# Patient Record
Sex: FEMALE | Race: WHITE | NOT HISPANIC OR LATINO | Employment: UNEMPLOYED | URBAN - METROPOLITAN AREA
[De-identification: names, ages, dates, MRNs, and addresses within clinical notes are randomized per-mention and may not be internally consistent; named-entity substitution may affect disease eponyms.]

---

## 2019-11-18 DIAGNOSIS — R87.619 ABNORMAL PAP SMEAR OF CERVIX: Primary | ICD-10-CM

## 2019-11-25 ENCOUNTER — OFFICE VISIT (OUTPATIENT)
Dept: OBSTETRICS AND GYNECOLOGY | Facility: CLINIC | Age: 29
End: 2019-11-25
Payer: MEDICAID

## 2019-11-25 VITALS
SYSTOLIC BLOOD PRESSURE: 131 MMHG | BODY MASS INDEX: 22.96 KG/M2 | HEIGHT: 71 IN | WEIGHT: 164 LBS | HEART RATE: 73 BPM | DIASTOLIC BLOOD PRESSURE: 86 MMHG

## 2019-11-25 DIAGNOSIS — F17.200 SMOKER: ICD-10-CM

## 2019-11-25 DIAGNOSIS — Z98.51 HISTORY OF TUBAL LIGATION: ICD-10-CM

## 2019-11-25 DIAGNOSIS — Z01.419 ENCOUNTER FOR GYNECOLOGICAL EXAMINATION WITHOUT ABNORMAL FINDING: Primary | ICD-10-CM

## 2019-11-25 DIAGNOSIS — N92.6 IRREGULAR MENSTRUAL CYCLE: ICD-10-CM

## 2019-11-25 PROCEDURE — 99385 PR PREVENTIVE VISIT,NEW,18-39: ICD-10-PCS | Mod: S$GLB,,, | Performed by: OBSTETRICS & GYNECOLOGY

## 2019-11-25 PROCEDURE — 99385 PREV VISIT NEW AGE 18-39: CPT | Mod: S$GLB,,, | Performed by: OBSTETRICS & GYNECOLOGY

## 2019-11-25 RX ORDER — VALACYCLOVIR HYDROCHLORIDE 500 MG/1
500 TABLET, FILM COATED ORAL 2 TIMES DAILY
Qty: 10 TABLET | Refills: 6 | Status: SHIPPED | OUTPATIENT
Start: 2019-11-25 | End: 2020-04-23

## 2019-11-25 RX ORDER — CYPROHEPTADINE HYDROCHLORIDE 4 MG/1
4 TABLET ORAL 3 TIMES DAILY PRN
COMMUNITY
End: 2020-04-23

## 2019-11-25 RX ORDER — ATORVASTATIN CALCIUM 20 MG/1
20 TABLET, FILM COATED ORAL DAILY
COMMUNITY
End: 2020-04-23

## 2019-11-25 NOTE — LETTER
November 25, 2019      CHAIM Jaimes-C  [facility] [address]           [place] [facility]  [address]  [place] [address]  Phone: [phone]  Fax: [phone]          Patient: Sandra Benito   MR Number: [id number]   YOB: 1990   Date of Visit: 11/25/2019       Dear Aleyda Antunez:    Thank you for referring Sandra Benito to me for evaluation. Attached you will find relevant portions of my assessment and plan of care.    If you have questions, please do not hesitate to call me. I look forward to following Sandra Benito along with you.    Sincerely,    Jair Moralez MD    Enclosure  CC:  No Recipients    If you would like to receive this communication electronically, please contact [email or web address] or [phone] to request more information on [facility] Link access.    For providers and/or their staff who would like to refer a patient to [facility], please contact us through our one-stop-shop provider referral line, [facility], at [phone].    If you feel you have received this communication in error or would no longer like to receive these types of communications, please e-mail [email or web address]

## 2019-11-25 NOTE — PROGRESS NOTES
Subjective:       Patient ID: Sandra Benito is a 29 y.o. female.    Chief Complaint:  Annual Exam      History of Present Illness  Pt here for gyn annual.  History and past labs reviewed with patient.    Complaints of several issues.    1.- pcos- patient states she has been diagnosed in the past.  She does not appear to have PCOS on initial inspection.  Her periods are abnormal.  We talked about taking birth control the patient declined.      2.  Decreased sex drive.  Patient has 3 children and a fiancee and some issues concerning herpes.  She is only 29 years old is worried that is hormonal.  We talked about this at length as well.     3.  Patient states that she has herpes.  However the outbreaks or on the inside of her thigh well away from her vaginal area.  She has had was cultured of 1 point.  She said she not could not take the medicine that started within a for her herpes and asked for something different.  I am not sure that she does in fact have genital herpes and asked her to come back whenever she had an outbreak so we cultured here.    4.  Patient has had abnormal Pap smears in the past.    5.  Patient wants to have another baby although she had her tubes tied.  We talked about seen Dr. marques at      Review of Systems  Review of Systems   Constitutional: Negative for chills and fever.   Respiratory: Negative for shortness of breath.    Cardiovascular: Negative for chest pain.   Gastrointestinal: Negative for abdominal pain, blood in stool, constipation, diarrhea, nausea, vomiting and reflux.   Genitourinary: Negative for dysmenorrhea, dyspareunia, dysuria, hematuria, hot flashes, menorrhagia, menstrual problem, pelvic pain, vaginal bleeding, vaginal discharge, postcoital bleeding and vaginal dryness.   Musculoskeletal: Negative for arthralgias and joint swelling.   Integumentary:  Negative for rash, hair changes, breast mass, nipple discharge and breast skin changes.   Psychiatric/Behavioral: Negative  for depression. The patient is not nervous/anxious.    Breast: Negative for asymmetry, lump, mass, nipple discharge and skin changes          Objective:    Physical Exam:   Constitutional: She appears well-developed and well-nourished. No distress.    HENT:   Head: Normocephalic and atraumatic.    Eyes: Conjunctivae and EOM are normal.    Neck: No tracheal deviation present. No thyromegaly present.    Cardiovascular: Exam reveals no clubbing, no cyanosis and no edema.     Pulmonary/Chest: Effort normal. No respiratory distress.        Abdominal: Soft. She exhibits no distension and no mass. There is no tenderness. There is no rebound and no guarding. No hernia.     Genitourinary: Rectum normal, vagina normal and uterus normal. Pelvic exam was performed with patient supine. There is no rash, tenderness, lesion or injury on the right labia. There is no rash, tenderness, lesion or injury on the left labia. Cervix is normal. Right adnexum displays no mass, no tenderness and no fullness. Left adnexum displays no mass, no tenderness and no fullness.                Skin: She is not diaphoretic. No cyanosis. Nails show no clubbing.        breast exam wnl- no nipple dc, skin changes, masses or lymph nodes palpated bilaterally    Assessment:        1. Encounter for gynecological examination without abnormal finding    2. Smoker    3. Irregular menstrual cycle    4. History of tubal ligation               Plan:      Pap  Referral ANTONETTE  Declines birth control

## 2019-12-09 ENCOUNTER — PROCEDURE VISIT (OUTPATIENT)
Dept: OBSTETRICS AND GYNECOLOGY | Facility: CLINIC | Age: 29
End: 2019-12-09
Payer: MEDICAID

## 2019-12-09 VITALS
HEART RATE: 60 BPM | DIASTOLIC BLOOD PRESSURE: 72 MMHG | BODY MASS INDEX: 20.36 KG/M2 | SYSTOLIC BLOOD PRESSURE: 109 MMHG | WEIGHT: 146 LBS

## 2019-12-09 DIAGNOSIS — R87.610 ASCUS WITH POSITIVE HIGH RISK HPV CERVICAL: Primary | ICD-10-CM

## 2019-12-09 DIAGNOSIS — R87.810 ASCUS WITH POSITIVE HIGH RISK HPV CERVICAL: Primary | ICD-10-CM

## 2019-12-09 PROCEDURE — 57452 COLPOSCOPY: ICD-10-PCS | Mod: S$GLB,,, | Performed by: OBSTETRICS & GYNECOLOGY

## 2019-12-09 PROCEDURE — 57452 EXAM OF CERVIX W/SCOPE: CPT | Mod: S$GLB,,, | Performed by: OBSTETRICS & GYNECOLOGY

## 2019-12-09 NOTE — PROCEDURES
Colposcopy  Date/Time: 12/9/2019 8:30 AM  Performed by: Jair Moralez MD  Authorized by: Jair Moralez MD     Consent Done?:  Yes (Verbal)    Colposcopy Site:  Cervix  Position:  Supine  Acrowhite Lesion: No    Atypical Vessels: No    Transformation Zone Adequate?: Yes    Biopsy?: No    ECC Performed?: No    LEEP Performed?: No    Estimated blood loss (cc):  0   Patient tolerated the procedure well with no immediate complications.     repap in 6 mo

## 2020-04-17 DIAGNOSIS — N39.0 FREQUENT UTI: Primary | ICD-10-CM

## 2020-04-23 ENCOUNTER — OFFICE VISIT (OUTPATIENT)
Dept: UROLOGY | Facility: CLINIC | Age: 30
End: 2020-04-23
Payer: COMMERCIAL

## 2020-04-23 ENCOUNTER — DOCUMENTATION ONLY (OUTPATIENT)
Dept: UROLOGY | Facility: CLINIC | Age: 30
End: 2020-04-23

## 2020-04-23 VITALS
WEIGHT: 146 LBS | RESPIRATION RATE: 18 BRPM | HEIGHT: 71 IN | DIASTOLIC BLOOD PRESSURE: 62 MMHG | SYSTOLIC BLOOD PRESSURE: 105 MMHG | HEART RATE: 56 BPM | BODY MASS INDEX: 20.44 KG/M2

## 2020-04-23 DIAGNOSIS — R10.2 PELVIC PAIN: ICD-10-CM

## 2020-04-23 DIAGNOSIS — N39.46 MIXED INCONTINENCE: Primary | ICD-10-CM

## 2020-04-23 DIAGNOSIS — R31.0 HEMATURIA, GROSS: ICD-10-CM

## 2020-04-23 DIAGNOSIS — N39.0 FREQUENT UTI: ICD-10-CM

## 2020-04-23 PROBLEM — R30.9 URINARY PAIN: Status: ACTIVE | Noted: 2018-07-15

## 2020-04-23 PROBLEM — R87.610 ATYPICAL SQUAMOUS CELL CHANGES OF UNDETERMINED SIGNIFICANCE (ASCUS) ON CERVICAL CYTOLOGY WITH POSITIVE HIGH RISK HUMAN PAPILLOMA VIRUS (HPV): Status: ACTIVE | Noted: 2018-02-20

## 2020-04-23 PROBLEM — R87.810 ATYPICAL SQUAMOUS CELL CHANGES OF UNDETERMINED SIGNIFICANCE (ASCUS) ON CERVICAL CYTOLOGY WITH POSITIVE HIGH RISK HUMAN PAPILLOMA VIRUS (HPV): Status: ACTIVE | Noted: 2018-02-20

## 2020-04-23 PROBLEM — A60.04 HERPES, VULVAR: Status: ACTIVE | Noted: 2018-10-03

## 2020-04-23 PROBLEM — Z98.51 STATUS POST TUBAL LIGATION: Status: ACTIVE | Noted: 2018-10-03

## 2020-04-23 PROBLEM — M79.7 FIBROMYALGIA: Status: ACTIVE | Noted: 2018-10-03

## 2020-04-23 PROBLEM — K58.9 IRRITABLE BOWEL SYNDROME: Status: ACTIVE | Noted: 2018-10-03

## 2020-04-23 PROBLEM — Z87.898 HISTORY OF SEIZURES: Status: ACTIVE | Noted: 2018-12-21

## 2020-04-23 LAB
BILIRUB UR QL STRIP: POSITIVE
BILIRUB UR QL STRIP: POSITIVE
GLUCOSE UR QL STRIP: NEGATIVE
GLUCOSE UR QL STRIP: NEGATIVE
KETONES UR QL STRIP: NEGATIVE
KETONES UR QL STRIP: POSITIVE
LEUKOCYTE ESTERASE UR QL STRIP: NEGATIVE
LEUKOCYTE ESTERASE UR QL STRIP: NEGATIVE
PH, POC UA: 5.5
PH, POC UA: 6
POC AMORP, URINE: ABNORMAL
POC AMORP, URINE: ABNORMAL
POC BACTI, URINE: ABNORMAL
POC BACTI, URINE: ABNORMAL
POC BLOOD, URINE: POSITIVE
POC BLOOD, URINE: POSITIVE
POC CASTS, URINE: ABNORMAL
POC CASTS, URINE: ABNORMAL
POC CRYST, URINE: ABNORMAL
POC CRYST, URINE: ABNORMAL
POC EPITH, URINE: ABNORMAL
POC EPITH, URINE: ABNORMAL
POC HCG, URINE: ABNORMAL
POC HCG, URINE: ABNORMAL
POC HYALIN, URINE: 0 LPF
POC HYALIN, URINE: 0 LPF
POC MUCUS, URINE: ABNORMAL
POC MUCUS, URINE: ABNORMAL
POC NITRATES, URINE: NEGATIVE
POC NITRATES, URINE: NEGATIVE
POC OTHER, URINE: ABNORMAL
POC OTHER, URINE: ABNORMAL
POC RBC, URINE: ABNORMAL HPF
POC RBC, URINE: ABNORMAL HPF
POC WBC, URINE: ABNORMAL HPF
POC WBC, URINE: ABNORMAL HPF
PROT UR QL STRIP: NEGATIVE
PROT UR QL STRIP: POSITIVE
SP GR UR STRIP: >=1.03 (ref 1–1.03)
SP GR UR STRIP: >=1.03 (ref 1–1.03)
UROBILINOGEN UR STRIP-ACNC: 1 (ref 0.1–1.1)
UROBILINOGEN UR STRIP-ACNC: 1 (ref 0.1–1.1)

## 2020-04-23 PROCEDURE — 3008F PR BODY MASS INDEX (BMI) DOCUMENTED: ICD-10-PCS | Mod: CPTII,S$GLB,, | Performed by: SPECIALIST

## 2020-04-23 PROCEDURE — 99204 OFFICE O/P NEW MOD 45 MIN: CPT | Mod: 25,S$GLB,, | Performed by: SPECIALIST

## 2020-04-23 PROCEDURE — 81002 PR URINALYSIS NONAUTO W/O SCOPE: ICD-10-PCS | Mod: S$GLB,,, | Performed by: SPECIALIST

## 2020-04-23 PROCEDURE — 81002 URINALYSIS NONAUTO W/O SCOPE: CPT | Mod: S$GLB,,, | Performed by: SPECIALIST

## 2020-04-23 PROCEDURE — 99204 PR OFFICE/OUTPT VISIT, NEW, LEVL IV, 45-59 MIN: ICD-10-PCS | Mod: 25,S$GLB,, | Performed by: SPECIALIST

## 2020-04-23 PROCEDURE — 3008F BODY MASS INDEX DOCD: CPT | Mod: CPTII,S$GLB,, | Performed by: SPECIALIST

## 2020-04-23 NOTE — PROGRESS NOTES
Subjective:       Patient ID: Sandra Benito is a 29 y.o. female.    Chief Complaint: Urinary Tract Infection (ongoing issue since infantcy); Flank Pain (approx 4 years/stays consistant); Other (pain in urthera area); Urinary Frequency (loses control of bladder since 2013 after ); and other (pressure/stinging pain when urinating)      HPI:  29-year-old female presenting to extend the urological care.  She has a very complex history.    As a child she has been having recurring urinary tract infections she reports.  Then there was an intervening period of several years when she did well.  She had a  section in  and started experiencing has recurrent infections as well as some incontinence.    Most of her symptoms have started within the last 3 years.  In 2018 she started reporting gross hematuria.  Since then she has blood in the urine at least once every month.  She also reports recurrent infections that normally present with pelvic pain as well as burning in the urethra.  She reports that these are culture proven infections.  She was told that she grows E coli in the urine.    She was seen by a urologist in [facility] who was planning to do cystoscopic evaluation but they could not get a scope in the patient because of a lot of tension and inability to just get the scope to pass into the bladder.  She reports that she was in severe pain so decision was made to proceed to the operating room to perform cystoscopy but she did not have insurance at that time.    She has been diagnosed with irritable bowel syndrome, she has polycystic ovary syndrome, there is no official diagnosis of endometriosis, she has been diagnosed with fibromyalgia.  She also reports inability to have sexual intercourse because he is so painful.  She reports losing control of urine when she coughs knees or strains as well as sometimes without making it to the bathroom.  She has to frequently change of clotting because of the  incontinence.  Again she reports these infections which seem to coincide with her monthly menstruation.    Patient denies any smoking but she has been baby in for so many years.    Past Medical History:   Past Medical History:   Diagnosis Date    ADHD     Anxiety     Bronchitis     Depression     Fibromyalgia     Head ache     Herpes     High cholesterol     Seizures     Thyroid disease        Past Surgical Historical:   Past Surgical History:   Procedure Laterality Date     SECTION      x2    DILATION AND CURETTAGE OF UTERUS      TUBAL LIGATION          Medications:   Medication List with Changes/Refills   Discontinued Medications    ATORVASTATIN (LIPITOR) 20 MG TABLET    Take 20 mg by mouth once daily.    CYPROHEPTADINE (PERIACTIN) 4 MG TABLET    Take 4 mg by mouth 3 (three) times daily as needed.    VALACYCLOVIR (VALTREX) 500 MG TABLET    Take 1 tablet (500 mg total) by mouth 2 (two) times daily. for 5 days        Past Social History:   Social History     Socioeconomic History    Marital status:      Spouse name: Not on file    Number of children: Not on file    Years of education: Not on file    Highest education level: Not on file   Occupational History    Not on file   Social Needs    Financial resource strain: Not on file    Food insecurity:     Worry: Not on file     Inability: Not on file    Transportation needs:     Medical: Not on file     Non-medical: Not on file   Tobacco Use    Smoking status: Current Every Day Smoker     Types: Vaping with nicotine   Substance and Sexual Activity    Alcohol use: Yes    Drug use: Never    Sexual activity: Yes     Partners: Male     Birth control/protection: See Surgical Hx   Lifestyle    Physical activity:     Days per week: Not on file     Minutes per session: Not on file    Stress: Not on file   Relationships    Social connections:     Talks on phone: Not on file     Gets together: Not on file     Attends Faith service:  Not on file     Active member of club or organization: Not on file     Attends meetings of clubs or organizations: Not on file     Relationship status: Not on file   Other Topics Concern    Not on file   Social History Narrative    Not on file       Allergies:   Review of patient's allergies indicates:   Allergen Reactions    Adhesive tape-silicones Rash        Family History:   Family History   Problem Relation Age of Onset    Hypertension Mother         Review of Systems:  Review of Systems - General ROS: negative  Psychological ROS: negative  Ophthalmic ROS: negative  ENT ROS: negative  Allergy and Immunology ROS: negative  Hematological and Lymphatic ROS: negative  Endocrine ROS: negative  Respiratory ROS: no cough, shortness of breath, or wheezing  Cardiovascular ROS: no chest pain or dyspnea on exertion  Gastrointestinal ROS: no abdominal pain, change in bowel habits, or black or bloody stools  Genito-Urinary ROS: positive for - recurring year old lower urinary tract symptoms pelvic pain  Musculoskeletal ROS: negative  Neurological ROS: no TIA or stroke symptoms  Dermatological ROS: negative     Physical Exam:  General Appearance:    Alert, cooperative, no distress, appears stated age   Head:    Normocephalic, without obvious abnormality, atraumatic   Eyes:    PERRL, conjunctiva/corneas clear, EOM's intact, fundi     benign, both eyes   Ears:    Normal TM's and external ear canals, both ears   Nose:   Nares normal, septum midline, mucosa normal, no drainage    or sinus tenderness   Throat:   Lips, mucosa, and tongue normal; teeth and gums normal   Neck:   Supple, symmetrical, trachea midline, no adenopathy;     thyroid:  no enlargement/tenderness/nodules; no carotid    bruit or JVD   Back:     Symmetric, no curvature, ROM normal, no CVA tenderness   Lungs:     Clear to auscultation bilaterally, respirations unlabored   Chest Wall:    No tenderness or deformity    Heart:    Regular rate and rhythm, S1 and  S2 normal, no murmur, rub   or gallop   Breast Exam:    No tenderness, masses, or nipple abnormality   Abdomen:     Soft, non-tender, bowel sounds active all four quadrants,     no masses, no organomegaly   Genitalia:    Normal female without lesion, discharge or tenderness    Pelvic exam revealed a narrow vaginal introitus.  Patient had visible discomfort on pelvic exam.  External genitalia looks normal without any lesions.  No prolapse.   Rectal:   Deferred    Extremities:   Extremities normal, atraumatic, no cyanosis or edema   Pulses:   2+ and symmetric all extremities   Skin:   Skin color, texture, turgor normal, no rashes or lesions   Lymph nodes:   Cervical, supraclavicular, and axillary nodes normal   Neurologic:   CNII-XII intact, normal strength, sensation and reflexes     throughout     Catheterized postvoid residual was performed with minimal output with the urinalysis from a voided and catheterized sample.    Assessment/Plan:       29-year-old female who is presenting to us with multiple complaints but I think she has chronic pelvic pain syndrome with possible interstitial cystitis.  She also has a known diagnosis of fibromyalgia, post cystic ovarian syndrome, irritable bowel syndrome.    1.  I am going to investigate the gross hematuria 1st.  We need to make sure.  She does not have any severe lesions that could be the source of this gross hematuria.  It is also possible that she is just having cyclical hematuria.  In order to accomplish this workup we will obtain a CT urogram followed by a cystoscopy.  The cystoscopy will likely be done in the operating room since patient could not tolerate office cystoscopy.  2.  After hematuria which address urine symptoms including incontinence and the possibility of interstitial cystitis.  3.  I had a long conversation with the patient about her condition.  Explained to her that there will be no medical fix because the seems like she has chronic pelvic issues.  We  will be planning at some point to refer her to a pelvic floor physical therapy because I think she could benefit from relaxation techniques.        Problem List Items Addressed This Visit     None      Visit Diagnoses     Frequent UTI        Relevant Orders    POCT Urinalysis (w/Micro Option)    Straight Cath

## 2020-04-23 NOTE — LETTER
April 23, 2020      Ellie Wilkes MD  [address]  [place] [facility] [place] [facility] [address]           [place] [facility]  [address] [address]  Phone: [phone]  Fax: [phone]          Patient: Sandra Benito   MR Number: [id number]   YOB: 1990   Date of Visit: 4/23/2020       Dear Dr. Ellie Wilkes:    Thank you for referring Sandra Benito to me for evaluation. Attached you will find relevant portions of my assessment and plan of care.    If you have questions, please do not hesitate to call me. I look forward to following Sandra Benito along with you.    Sincerely,    Silvio Li MD    Enclosure  CC:  No Recipients    If you would like to receive this communication electronically, please contact [email or web address] or [phone] to request more information on [facility] Link access.    For providers and/or their staff who would like to refer a patient to [facility], please contact us through our one-stop-shop provider referral line, [facility], at [phone].    If you feel you have received this communication in error or would no longer like to receive these types of communications, please e-mail [email or web address]

## 2020-04-23 NOTE — PROGRESS NOTES
Record request faxed to Dr. Eugenio Burden in [place], [place] ([facility])    Phone: [phone]  Fax: [phone]